# Patient Record
Sex: FEMALE | Race: WHITE | NOT HISPANIC OR LATINO | URBAN - METROPOLITAN AREA
[De-identification: names, ages, dates, MRNs, and addresses within clinical notes are randomized per-mention and may not be internally consistent; named-entity substitution may affect disease eponyms.]

---

## 2017-08-30 ENCOUNTER — OFFICE VISIT (OUTPATIENT)
Dept: URGENT CARE | Facility: PHYSICIAN GROUP | Age: 22
End: 2017-08-30
Payer: COMMERCIAL

## 2017-08-30 ENCOUNTER — HOSPITAL ENCOUNTER (OUTPATIENT)
Dept: RADIOLOGY | Facility: MEDICAL CENTER | Age: 22
End: 2017-08-30
Attending: PHYSICIAN ASSISTANT
Payer: COMMERCIAL

## 2017-08-30 VITALS
RESPIRATION RATE: 16 BRPM | SYSTOLIC BLOOD PRESSURE: 104 MMHG | WEIGHT: 108 LBS | DIASTOLIC BLOOD PRESSURE: 60 MMHG | BODY MASS INDEX: 19.88 KG/M2 | HEIGHT: 62 IN | TEMPERATURE: 98.6 F | HEART RATE: 94 BPM | OXYGEN SATURATION: 97 %

## 2017-08-30 DIAGNOSIS — S99.922A INJURY OF FOOT, LEFT, INITIAL ENCOUNTER: ICD-10-CM

## 2017-08-30 DIAGNOSIS — S92.351A CLOSED FRACTURE OF FIFTH METATARSAL BONE OF RIGHT FOOT, PHYSEAL INVOLVEMENT UNSPECIFIED, INITIAL ENCOUNTER: Primary | ICD-10-CM
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
## 2017-08-30 PROCEDURE — 99204 OFFICE O/P NEW MOD 45 MIN: CPT | Performed by: PHYSICIAN ASSISTANT

## 2017-08-30 PROCEDURE — 73630 X-RAY EXAM OF FOOT: CPT | Mod: LT

## 2017-08-30 PROCEDURE — L4386 NON-PNEUM WALK BOOT PRE CST: HCPCS | Performed by: PHYSICIAN ASSISTANT

## 2017-08-30 RX ORDER — LEVOTHYROXINE SODIUM 0.03 MG/1
25 TABLET ORAL
COMMUNITY

## 2017-08-30 ASSESSMENT — ENCOUNTER SYMPTOMS
SENSORY CHANGE: 0
ARTHRALGIAS: 1
FOCAL WEAKNESS: 0
TINGLING: 0
NUMBNESS: 0

## 2017-08-30 NOTE — PROGRESS NOTES
"Subjective:      Awa Mcclain is a 22 y.o. female who presents with Foot Pain (pt fell down a couple of steps on saturday and is unable to bear weight without pain)    Pt PMH, SocHx, SurgHx, FamHx, Drug allergies and medications reviewed with pt/[facility].      Family history reviewed, it is not pertinent to this complaint.           Patient presents with:  Foot Pain: pt fell down a couple of steps on saturday and is unable to bear weight without pain.            Foot Swelling   This is a new problem. The current episode started in the past 7 days. The problem occurs constantly. The problem has been unchanged. Associated symptoms include arthralgias. Pertinent negatives include no numbness. The symptoms are aggravated by exertion, standing and walking (palpation). She has tried ice, immobilization and rest for the symptoms. The treatment provided mild relief.       Review of Systems   Musculoskeletal: Positive for arthralgias.        Left foot swelling/pain   Neurological: Negative for tingling, sensory change, focal weakness and numbness.   All other systems reviewed and are negative.         Objective:     /60   Pulse 94   Temp 37 °C (98.6 °F)   Resp 16   Ht 1.575 m (5' 2\")   Wt 49 kg (108 lb)   SpO2 97%   BMI 19.75 kg/m²      Physical Exam   Constitutional: She is oriented to person, place, and time. She appears well-developed and well-nourished. No distress.   HENT:   Head: Normocephalic and atraumatic.   Nose: Nose normal.   Eyes: Conjunctivae and EOM are normal. Pupils are equal, round, and reactive to light.   Neck: Normal range of motion. Neck supple.   Cardiovascular: Normal rate and intact distal pulses.    Pulmonary/Chest: Effort normal.   Musculoskeletal:        Right shoulder: She exhibits decreased range of motion, tenderness, bony tenderness, swelling and pain. She exhibits no effusion, no crepitus, no deformity and normal pulse.        Left foot: There is decreased range of motion, " tenderness, bony tenderness and swelling. There is no laceration.        Feet:    Neurological: She is alert and oriented to person, place, and time.   Skin: Skin is warm and dry.   Psychiatric: She has a normal mood and affect.   Nursing note and vitals reviewed.         Xray images viewed and interpreted by me, confirmed by radiology:      Oblique fracture to  5th metatarsal midshaft without angulation, + soft tissue swelling.       Impression       Oblique fracture in the mid to distal fifth metatarsal with overlying soft tissue edema.   Reading Provider Reading Date   Gabriella Saul M.D. Aug 30, 2017   Signing Provider Signing Date Signing Time   Gabriella Saul M.D. Aug 30, 2017  2:18 PM       Assessment/Plan:     1. Closed fracture of fifth metatarsal bone of right foot, physeal involvement unspecified, initial encounter  levothyroxine (SYNTHROID) 25 MCG Tab    DX-FOOT-COMPLETE 3+ LEFT   2. Fall, initial encounter  levothyroxine (SYNTHROID) 25 MCG Tab    DX-FOOT-COMPLETE 3+ LEFT     Walking boot fitted.  Distal neuro/vascular intact after placement.     RICE TREATMENT FOR EXTREMITY INJURIES:  R-rest the extremity as much as possible while pain and swelling persist  I-ice the extremity 15 minutes every 2 hours for the first 24 hours, then 4-5 times daily   C-compress the extremity either with splint or ace wrap as directed  E-elevate the extremity to help with swelling    Motrin/Advil/Ibuprophen 600 mg every 6 hours as needed for pain or fever.    PT should follow up with PCP in 1-2 days for re-evaluation if symptoms have not improved.  Discussed red flags and reasons to return to UC or ED.  Pt and/or family verbalized understanding of diagnosis and follow up instructions and was given informational handout on diagnosis.  PT discharged.

## 2017-08-30 NOTE — PATIENT INSTRUCTIONS
Metatarsal Fracture  A metatarsal fracture is a break in a metatarsal bone. Metatarsal bones connect your toe bones to your ankle bones.  CAUSES  This type of fracture may be caused by:  · A sudden twisting of your foot.  · A fall onto your foot.  · Overuse or repetitive exercise.  RISK FACTORS  This condition is more likely to develop in people who:  · Play contact sports.  · Have a bone disease.  · Have a low calcium level.  SYMPTOMS  Symptoms of this condition include:  · Pain that is worse when walking or standing.  · Pain when pressing on the foot or moving the toes.  · Swelling.  · Bruising on the top or bottom of the foot.  · A foot that appears shorter than the other one.  DIAGNOSIS  This condition is diagnosed with a physical exam. You may also have imaging tests, such as:  · X-rays.  · A CT scan.  · MRI.  TREATMENT  Treatment for this condition depends on its severity and whether a bone has moved out of place. Treatment may involve:  · Rest.  · Wearing foot support such as a cast, splint, or boot for several weeks.  · Using crutches.  · Surgery to move bones back into the right position. Surgery is usually needed if there are many pieces of broken bone or bones that are very out of place (displaced fracture).  · Physical therapy. This may be needed to help you regain full movement and strength in your foot.  You will need to return to your health care provider to have X-rays taken until your bones heal. Your health care provider will look at the X-rays to make sure that your foot is healing well.  HOME CARE INSTRUCTIONS   If You Have a Cast:  · Do not stick anything inside the cast to scratch your skin. Doing that increases your risk of infection.  · Check the skin around the cast every day. Report any concerns to your health care provider. You may put lotion on dry skin around the edges of the cast. Do not apply lotion to the skin underneath the cast.  · Keep the cast clean and dry.  If You Have a Splint  or a Supportive Boot:  · Wear it as directed by your health care provider. Remove it only as directed by your health care provider.  · Loosen it if your toes become numb and tingle, or if they turn cold and blue.  · Keep it clean and dry.  Bathing  · Do not take baths, swim, or use a hot tub until your health care provider approves. Ask your health care provider if you can take showers. You may only be allowed to take sponge baths for bathing.  · If your health care provider approves bathing and showering, cover the cast or splint with a watertight plastic bag to protect it from water. Do not let the cast or splint get wet.  Managing Pain, Stiffness, and Swelling  · If directed, apply ice to the injured area (if you have a splint, not a cast).  ¨ Put ice in a plastic bag.  ¨ Place a towel between your skin and the bag.  ¨ Leave the ice on for 20 minutes, 2-3 times per day.  · Move your toes often to avoid stiffness and to lessen swelling.  · Raise (elevate) the injured area above the level of your heart while you are sitting or lying down.  Driving  · Do not drive or operate heavy machinery while taking pain medicine.  · Do not drive while wearing foot support on a foot that you use for driving.  Activity  · Return to your normal activities as directed by your health care provider. Ask your health care provider what activities are safe for you.  · Perform exercises as directed by your health care provider or physical therapist.  Safety  · Do not use the injured foot to support your body weight until your health care provider says that you can. Use crutches as directed by your health care provider.  General Instructions  · Do not put pressure on any part of the cast or splint until it is fully hardened. This may take several hours.  · Do not use any tobacco products, including cigarettes, chewing tobacco, or e-cigarettes. Tobacco can delay bone healing. If you need help quitting, ask your health care  provider.  · Take medicines only as directed by your health care provider.  · Keep all follow-up visits as directed by your health care provider. This is important.  SEEK MEDICAL CARE IF:  · You have a fever.  · Your cast, splint, or boot is too loose or too tight.  · Your cast, splint, or boot is damaged.  · Your pain medicine is not helping.  · You have pain, tingling, or numbness in your foot that is not going away.  SEEK IMMEDIATE MEDICAL CARE IF:  · You have severe pain.  · You have tingling or numbness in your foot that is getting worse.  · Your foot feels cold or becomes numb.  · Your foot changes color.     This information is not intended to replace advice given to you by your health care provider. Make sure you discuss any questions you have with your health care provider.     Document Released: 09/09/2003 Document Revised: 05/03/2016 Document Reviewed: 10/14/2015  [employer] Interactive Patient Education ©2016 [employer]